# Patient Record
Sex: FEMALE | Race: BLACK OR AFRICAN AMERICAN | NOT HISPANIC OR LATINO | Employment: UNEMPLOYED | URBAN - METROPOLITAN AREA
[De-identification: names, ages, dates, MRNs, and addresses within clinical notes are randomized per-mention and may not be internally consistent; named-entity substitution may affect disease eponyms.]

---

## 2020-07-30 ENCOUNTER — TELEPHONE (OUTPATIENT)
Dept: ORTHOPEDICS | Facility: CLINIC | Age: 30
End: 2020-07-30

## 2020-07-30 NOTE — TELEPHONE ENCOUNTER
----- Message from Shreya Haile sent at 7/30/2020  1:46 PM CDT -----  Regarding: pt  Patient Requesting Sooner Appointment.     Reason for sooner appt.: pt is calling to speak with the nurse pt was seen in the Er over the weekend for right ankle pain pt was to follow up before August 2, 2020  When is the first available appointment? 8/2020  Communication Preference: can you please call pt at [phone]  Additional Information: none    VALERIE

## 2020-07-30 NOTE — TELEPHONE ENCOUNTER
Spoke with pt. ED follow up scheduled. Pt wanted to be seen as soon as possible. Pt verbalized understanding.

## 2020-07-31 ENCOUNTER — TELEPHONE (OUTPATIENT)
Dept: ORTHOPEDICS | Facility: CLINIC | Age: 30
End: 2020-07-31

## 2020-07-31 NOTE — TELEPHONE ENCOUNTER
Patient requested to have appointment rescheduled, patient verbalized understanding of of next appointment date, time and location

## 2020-08-03 ENCOUNTER — TELEPHONE (OUTPATIENT)
Dept: ORTHOPEDICS | Facility: CLINIC | Age: 30
End: 2020-08-03

## 2020-08-03 NOTE — TELEPHONE ENCOUNTER
Patient verbalized understanding of an earlier appointment time on 08/10 due too provider being unavailable that afternoon

## 2020-12-22 ENCOUNTER — OFFICE VISIT (OUTPATIENT)
Dept: SURGERY | Facility: CLINIC | Age: 30
End: 2020-12-22
Payer: MEDICAID

## 2020-12-22 VITALS
OXYGEN SATURATION: 94 % | HEIGHT: 60 IN | HEART RATE: 86 BPM | BODY MASS INDEX: 56.28 KG/M2 | WEIGHT: 286.69 LBS | DIASTOLIC BLOOD PRESSURE: 97 MMHG | RESPIRATION RATE: 16 BRPM | SYSTOLIC BLOOD PRESSURE: 138 MMHG

## 2020-12-22 DIAGNOSIS — K80.20 CALCULUS OF GALLBLADDER WITHOUT CHOLECYSTITIS WITHOUT OBSTRUCTION: Primary | ICD-10-CM

## 2020-12-22 DIAGNOSIS — Z01.818 PRE-OP TESTING: ICD-10-CM

## 2020-12-22 DIAGNOSIS — K80.20 SYMPTOMATIC CHOLELITHIASIS: ICD-10-CM

## 2020-12-22 PROCEDURE — 99999 PR PBB SHADOW E&M-EST. PATIENT-LVL V: ICD-10-PCS | Mod: PBBFAC,,, | Performed by: SURGERY

## 2020-12-22 PROCEDURE — 99203 PR OFFICE/OUTPT VISIT, NEW, LEVL III, 30-44 MIN: ICD-10-PCS | Mod: S$PBB,,, | Performed by: SURGERY

## 2020-12-22 PROCEDURE — 99215 OFFICE O/P EST HI 40 MIN: CPT | Mod: PBBFAC,PN | Performed by: SURGERY

## 2020-12-22 PROCEDURE — 99203 OFFICE O/P NEW LOW 30 MIN: CPT | Mod: S$PBB,,, | Performed by: SURGERY

## 2020-12-22 PROCEDURE — 99999 PR PBB SHADOW E&M-EST. PATIENT-LVL V: CPT | Mod: PBBFAC,,, | Performed by: SURGERY

## 2020-12-22 RX ORDER — ENOXAPARIN SODIUM 300 MG/3ML
40 INJECTION INTRAVENOUS; SUBCUTANEOUS
Status: CANCELLED | OUTPATIENT
Start: 2020-12-28

## 2020-12-22 NOTE — LETTER
December 22, 2020      Aaliyah Bustillos, NP  [address]  [address]  [place] [address]           [facility] [address]  [address], [facility] [address]  [facility] [address]  Phone: [phone]  Fax: [phone]          Patient: Arlene Mathur   MR Number: [id number]   YOB: 1990   Date of Visit: 12/22/2020       Dear Aaliyah Bustillos:    Thank you for referring Arlene Mathur to me for evaluation. Attached you will find relevant portions of my assessment and plan of care.    If you have questions, please do not hesitate to call me. I look forward to following Arlene Mathur along with you.    Sincerely,    Edmond Mayfield MD    Enclosure  CC:  No Recipients    If you would like to receive this communication electronically, please contact [email or web address] or [phone] to request more information on [employer] Link access.    For providers and/or their staff who would like to refer a patient to [facility], please contact us through our one-stop-shop provider referral line, [facility], at [phone].    If you feel you have received this communication in error or would no longer like to receive these types of communications, please e-mail [email or web address]

## 2020-12-22 NOTE — PROGRESS NOTES
"History & Physical    SUBJECTIVE:     History of Present Illness:  Patient is a 30 y.o. female presents with symptoms consistent with symptomatic cholelithiasis.  She complains of right upper quadrant abdominal pain initially with meals.  Also has some nausea associated with this.  Pain is usually the mid abdomen right upper quadrant.  Also states that she has reflux with certain foods.  She had a CT scan performed when she went to the emergency department which showed gallstones and some gallbladder wall thickening.  I discussed with her these findings went over the CT scan and showed her her gallbladder.  She agrees to proceed with laparoscopic cholecystectomy.      Chief Complaint   Patient presents with    Gall Bladder Problem       Review of patient's allergies indicates:   Allergen Reactions    Morphine Other (See Comments)     Pt states she does not like the way it makes her feel "        Current Outpatient Medications   Medication Sig Dispense Refill    HYDROcodone-acetaminophen (NORCO) 5-325 mg per tablet Take 1 tablet by mouth every 4 (four) hours as needed. (Patient not taking: Reported on 2020) 10 tablet 0     No current facility-administered medications for this visit.        History reviewed. No pertinent past medical history.  Past Surgical History:   Procedure Laterality Date    ANKLE SURGERY  2004    R ankle with hardware placed     SECTION, CLASSIC  2012    LEG SURGERY      plates and screws     History reviewed. No pertinent family history.  Social History     Tobacco Use    Smoking status: Former Smoker     Years: 2.00     Types: Cigarettes     Quit date: 6/3/2011     Years since quittin.5   Substance Use Topics    Alcohol use: Yes     Alcohol/week: 1.0 standard drinks     Types: 1 Shots of liquor per week    Drug use: No        Review of Systems:  Review of Systems   Constitutional: Negative for appetite change, fatigue, fever and unexpected weight change.   HENT: " Negative for sore throat and trouble swallowing.    Eyes: Negative.    Respiratory: Negative for cough, shortness of breath and wheezing.    Cardiovascular: Negative for chest pain and leg swelling.   Gastrointestinal: Positive for abdominal pain and nausea. Negative for abdominal distention, blood in stool, constipation, diarrhea and vomiting.   Endocrine: Negative.    Genitourinary: Negative.    Musculoskeletal: Negative for back pain.   Skin: Negative.  Negative for rash.   Allergic/Immunologic: Negative.    Neurological: Negative.    Hematological: Negative.    Psychiatric/Behavioral: Negative for confusion.       OBJECTIVE:     Vital Signs (Most Recent)  Pulse: 86 (12/22/20 1514)  Resp: 16 (12/22/20 1514)  BP: (!) 138/97 (12/22/20 1514)  SpO2: (!) 94 % (12/22/20 1514)  5' (1.524 m)  130 kg (286 lb 11.3 oz)     Physical Exam:  Physical Exam  Vitals signs and nursing note reviewed.   Constitutional:       Appearance: She is well-developed.   HENT:      Head: Normocephalic and atraumatic.   Neck:      Musculoskeletal: Normal range of motion.   Cardiovascular:      Rate and Rhythm: Normal rate.      Heart sounds: Normal heart sounds.   Pulmonary:      Effort: Pulmonary effort is normal.   Abdominal:      General: Bowel sounds are normal. There is no distension.      Palpations: Abdomen is soft.      Tenderness: There is abdominal tenderness (Mild in right upper quadrant).   Musculoskeletal: Normal range of motion.   Skin:     General: Skin is warm and dry.      Capillary Refill: Capillary refill takes less than 2 seconds.   Neurological:      Mental Status: She is alert and oriented to person, place, and time.   Psychiatric:         Behavior: Behavior normal.         Laboratory  CBC: Reviewed  CMP: Reviewed    Diagnostic Results:  CT: Reviewed  Cholelithiasis with gallbladder wall    ASSESSMENT/PLAN:     30-year-old female with symptomatic cholelithiasis  Risks and benefits of surgery discussed with  patient  Consent signed in clinic     I described the nature of the patient's pathology and the spectrum of disease presentation ranging from mild discomfort to acute cholecystitis or gallstone/necrotizing pancreatitis. Non-operative therapy was discussed, but the patient would require a strict non-fat diet and still this would not guarantee resolution of symptoms. I think surgery is in the patient's best interest given the severity of symptoms, and she is agreeable. I described the risks of the surgery including but not limited to bleeding, infection, wound complications, injury to local structures including the common bile duct, bile leak, persistent abd pain, and potential need for further interventions. The patient demonstrated understanding of these risks and asked appropriate questions. A consent form was signed today, and the patient will be booked for surgery as laparoscopic cholecystectomy, possible open, possible intraoperative cholangiogram.

## 2020-12-22 NOTE — PATIENT INSTRUCTIONS
SURGERY INSTRUCTIONS:    Surgery date 12/28/2020      · You will arrive on the 1st floor of [facility] at the registration desk the morning of surgery.  · You WILL need to be tested for COVID-19, 3 days prior to your surgery date.  · Do not eat or drink after midnight the night before surgery including water. It is okay to brush your teeth. Do not have gum, candy or mints  · Please shower the night before and the morning of your surgery with antibacterial soap, concentrating on the area where your surgery will be performed, be sure to get into skin folds as well.   · No shaving of procedural area at least 4-5 days before surgery due to increased risk of skin irritation and/or possible infection.  · Wear loose fitting/comfortable clothing allowing for bandages.  · If you are going home on the same day of surgery, you must arrange for a family member or a friend to drive you home.  Public transportation is prohibited.  You will not be able to drive home if you were given anesthesia or sedation.  · Stop taking Aspirin, Ibuprofen, Motrin, Aleve, Fish oil or Vitamin E for at least 7 days before your surgery. You may use Tylenol.  · Pre-OP will call you with any additional instructions, surgery arrival time, and to schedule your COVID-19 test if it is not already scheduled.  · If you have any additional questions, you may call pre-OP at [phone]

## 2020-12-28 PROBLEM — K80.20 SYMPTOMATIC CHOLELITHIASIS: Status: ACTIVE | Noted: 2020-12-28

## 2021-01-06 ENCOUNTER — TELEPHONE (OUTPATIENT)
Dept: SURGERY | Facility: CLINIC | Age: 31
End: 2021-01-06

## 2021-01-11 ENCOUNTER — OFFICE VISIT (OUTPATIENT)
Dept: SURGERY | Facility: CLINIC | Age: 31
End: 2021-01-11
Payer: MEDICAID

## 2021-01-11 VITALS
HEART RATE: 71 BPM | SYSTOLIC BLOOD PRESSURE: 134 MMHG | RESPIRATION RATE: 16 BRPM | HEIGHT: 60 IN | WEIGHT: 283.81 LBS | DIASTOLIC BLOOD PRESSURE: 94 MMHG | BODY MASS INDEX: 55.72 KG/M2 | OXYGEN SATURATION: 97 %

## 2021-01-11 DIAGNOSIS — Z98.890 POST-OPERATIVE STATE: Primary | ICD-10-CM

## 2021-01-11 PROCEDURE — 99024 PR POST-OP FOLLOW-UP VISIT: ICD-10-PCS | Mod: ,,, | Performed by: SURGERY

## 2021-01-11 PROCEDURE — 99024 POSTOP FOLLOW-UP VISIT: CPT | Mod: ,,, | Performed by: SURGERY

## 2021-01-11 PROCEDURE — 99213 OFFICE O/P EST LOW 20 MIN: CPT | Mod: PBBFAC,PN | Performed by: SURGERY

## 2021-01-11 PROCEDURE — 99999 PR PBB SHADOW E&M-EST. PATIENT-LVL III: CPT | Mod: PBBFAC,,, | Performed by: SURGERY

## 2021-01-11 PROCEDURE — 99999 PR PBB SHADOW E&M-EST. PATIENT-LVL III: ICD-10-PCS | Mod: PBBFAC,,, | Performed by: SURGERY

## 2021-04-26 ENCOUNTER — PATIENT MESSAGE (OUTPATIENT)
Dept: RESEARCH | Facility: HOSPITAL | Age: 31
End: 2021-04-26

## 2021-12-19 ENCOUNTER — HOSPITAL ENCOUNTER (EMERGENCY)
Facility: HOSPITAL | Age: 31
Discharge: HOME | End: 2021-12-19
Attending: EMERGENCY MEDICINE
Payer: MEDICAID

## 2021-12-19 VITALS
TEMPERATURE: 98 F | SYSTOLIC BLOOD PRESSURE: 132 MMHG | BODY MASS INDEX: 46.72 KG/M2 | HEART RATE: 72 BPM | DIASTOLIC BLOOD PRESSURE: 88 MMHG | OXYGEN SATURATION: 99 % | WEIGHT: 238 LBS | RESPIRATION RATE: 18 BRPM | HEIGHT: 60 IN

## 2021-12-19 DIAGNOSIS — M25.511 RIGHT SHOULDER PAIN: Primary | ICD-10-CM

## 2021-12-19 LAB
B-HCG UR QL: NEGATIVE
CTP QC/QA: YES

## 2021-12-19 PROCEDURE — 63600175 PHARM REV CODE 636 W HCPCS: Performed by: NURSE PRACTITIONER

## 2021-12-19 PROCEDURE — 81025 URINE PREGNANCY TEST: CPT | Performed by: NURSE PRACTITIONER

## 2021-12-19 PROCEDURE — 96372 THER/PROPH/DIAG INJ SC/IM: CPT

## 2021-12-19 PROCEDURE — 99284 EMERGENCY DEPT VISIT MOD MDM: CPT | Mod: 25

## 2021-12-19 RX ORDER — KETOROLAC TROMETHAMINE 10 MG/1
10 TABLET, FILM COATED ORAL EVERY 6 HOURS PRN
Qty: 20 TABLET | Refills: 0 | Status: SHIPPED | OUTPATIENT
Start: 2021-12-19 | End: 2021-12-24

## 2021-12-19 RX ORDER — LIDOCAINE 50 MG/G
1 PATCH TOPICAL DAILY
Qty: 15 PATCH | Refills: 0 | Status: SHIPPED | OUTPATIENT
Start: 2021-12-19

## 2021-12-19 RX ORDER — KETOROLAC TROMETHAMINE 30 MG/ML
30 INJECTION, SOLUTION INTRAMUSCULAR; INTRAVENOUS
Status: COMPLETED | OUTPATIENT
Start: 2021-12-19 | End: 2021-12-19

## 2021-12-19 RX ORDER — ORPHENADRINE CITRATE 100 MG/1
100 TABLET, EXTENDED RELEASE ORAL 2 TIMES DAILY PRN
Qty: 20 TABLET | Refills: 0 | Status: SHIPPED | OUTPATIENT
Start: 2021-12-19 | End: 2021-12-29

## 2021-12-19 RX ORDER — KETOROLAC TROMETHAMINE 30 MG/ML
INJECTION, SOLUTION INTRAMUSCULAR; INTRAVENOUS
Status: DISPENSED
Start: 2021-12-19 | End: 2021-12-19

## 2021-12-19 RX ADMIN — KETOROLAC TROMETHAMINE 30 MG: 30 INJECTION, SOLUTION INTRAMUSCULAR; INTRAVENOUS at 11:12

## 2021-12-29 ENCOUNTER — LAB VISIT (OUTPATIENT)
Dept: PRIMARY CARE CLINIC | Facility: OTHER | Age: 31
End: 2021-12-29
Attending: INTERNAL MEDICINE
Payer: MEDICAID

## 2021-12-29 DIAGNOSIS — Z20.822 ENCOUNTER FOR LABORATORY TESTING FOR COVID-19 VIRUS: ICD-10-CM

## 2021-12-29 PROCEDURE — U0003 INFECTIOUS AGENT DETECTION BY NUCLEIC ACID (DNA OR RNA); SEVERE ACUTE RESPIRATORY SYNDROME CORONAVIRUS 2 (SARS-COV-2) (CORONAVIRUS DISEASE [COVID-19]), AMPLIFIED PROBE TECHNIQUE, MAKING USE OF HIGH THROUGHPUT TECHNOLOGIES AS DESCRIBED BY CMS-2020-01-R: HCPCS | Performed by: INTERNAL MEDICINE

## 2021-12-30 ENCOUNTER — PATIENT MESSAGE (OUTPATIENT)
Dept: ADMINISTRATIVE | Facility: OTHER | Age: 31
End: 2021-12-30
Payer: MEDICAID

## 2021-12-31 LAB
SARS-COV-2 RNA RESP QL NAA+PROBE: DETECTED
SARS-COV-2- CYCLE NUMBER: 17

## 2022-02-27 ENCOUNTER — HOSPITAL ENCOUNTER (EMERGENCY)
Facility: HOSPITAL | Age: 32
Discharge: HOME | End: 2022-02-28
Attending: EMERGENCY MEDICINE
Payer: MEDICAID

## 2022-02-27 DIAGNOSIS — R10.9 ABDOMINAL PAIN, UNSPECIFIED ABDOMINAL LOCATION: Primary | ICD-10-CM

## 2022-02-27 LAB
B-HCG UR QL: NEGATIVE
BASOPHILS # BLD AUTO: 0.03 K/UL (ref 0–0.2)
BASOPHILS NFR BLD: 0.2 % (ref 0–1.9)
BILIRUB UR QL STRIP: NEGATIVE
CLARITY UR: CLEAR
COLOR UR: YELLOW
CTP QC/QA: YES
DIFFERENTIAL METHOD: ABNORMAL
EOSINOPHIL # BLD AUTO: 0 K/UL (ref 0–0.5)
EOSINOPHIL NFR BLD: 0.2 % (ref 0–8)
ERYTHROCYTE [DISTWIDTH] IN BLOOD BY AUTOMATED COUNT: 20.1 % (ref 11.5–14.5)
GLUCOSE UR QL STRIP: NEGATIVE
HCT VFR BLD AUTO: 39.3 % (ref 37–48.5)
HGB BLD-MCNC: 11.8 G/DL (ref 12–16)
HGB UR QL STRIP: NEGATIVE
IMM GRANULOCYTES # BLD AUTO: 0.04 K/UL (ref 0–0.04)
IMM GRANULOCYTES NFR BLD AUTO: 0.3 % (ref 0–0.5)
KETONES UR QL STRIP: NEGATIVE
LEUKOCYTE ESTERASE UR QL STRIP: NEGATIVE
LYMPHOCYTES # BLD AUTO: 1.1 K/UL (ref 1–4.8)
LYMPHOCYTES NFR BLD: 8 % (ref 18–48)
MCH RBC QN AUTO: 20.1 PG (ref 27–31)
MCHC RBC AUTO-ENTMCNC: 30 G/DL (ref 32–36)
MCV RBC AUTO: 67 FL (ref 82–98)
MONOCYTES # BLD AUTO: 0.6 K/UL (ref 0.3–1)
MONOCYTES NFR BLD: 4.4 % (ref 4–15)
NEUTROPHILS # BLD AUTO: 11.4 K/UL (ref 1.8–7.7)
NEUTROPHILS NFR BLD: 86.9 % (ref 38–73)
NITRITE UR QL STRIP: NEGATIVE
NRBC BLD-RTO: 0 /100 WBC
PH UR STRIP: 6 [PH] (ref 5–8)
PLATELET # BLD AUTO: 458 K/UL (ref 150–450)
PMV BLD AUTO: 10.7 FL (ref 9.2–12.9)
PROT UR QL STRIP: NEGATIVE
RBC # BLD AUTO: 5.86 M/UL (ref 4–5.4)
SP GR UR STRIP: 1.01 (ref 1–1.03)
URN SPEC COLLECT METH UR: NORMAL
UROBILINOGEN UR STRIP-ACNC: NEGATIVE EU/DL
WBC # BLD AUTO: 13.16 K/UL (ref 3.9–12.7)

## 2022-02-27 PROCEDURE — 83690 ASSAY OF LIPASE: CPT | Performed by: PHYSICIAN ASSISTANT

## 2022-02-27 PROCEDURE — 85025 COMPLETE CBC W/AUTO DIFF WBC: CPT | Performed by: PHYSICIAN ASSISTANT

## 2022-02-27 PROCEDURE — 81003 URINALYSIS AUTO W/O SCOPE: CPT | Performed by: EMERGENCY MEDICINE

## 2022-02-27 PROCEDURE — 80053 COMPREHEN METABOLIC PANEL: CPT | Performed by: PHYSICIAN ASSISTANT

## 2022-02-27 PROCEDURE — 63600175 PHARM REV CODE 636 W HCPCS: Performed by: PHYSICIAN ASSISTANT

## 2022-02-27 PROCEDURE — 83735 ASSAY OF MAGNESIUM: CPT | Performed by: PHYSICIAN ASSISTANT

## 2022-02-27 PROCEDURE — 99285 EMERGENCY DEPT VISIT HI MDM: CPT | Mod: 25

## 2022-02-27 PROCEDURE — 96372 THER/PROPH/DIAG INJ SC/IM: CPT

## 2022-02-27 PROCEDURE — 81025 URINE PREGNANCY TEST: CPT | Performed by: EMERGENCY MEDICINE

## 2022-02-27 RX ORDER — DICYCLOMINE HYDROCHLORIDE 10 MG/ML
20 INJECTION INTRAMUSCULAR
Status: COMPLETED | OUTPATIENT
Start: 2022-02-27 | End: 2022-02-27

## 2022-02-27 RX ADMIN — DICYCLOMINE HYDROCHLORIDE 20 MG: 20 INJECTION INTRAMUSCULAR at 11:02

## 2022-02-28 VITALS
SYSTOLIC BLOOD PRESSURE: 134 MMHG | HEART RATE: 98 BPM | TEMPERATURE: 98 F | RESPIRATION RATE: 16 BRPM | BODY MASS INDEX: 45.16 KG/M2 | DIASTOLIC BLOOD PRESSURE: 79 MMHG | HEIGHT: 60 IN | WEIGHT: 230 LBS | OXYGEN SATURATION: 99 %

## 2022-02-28 LAB
ALBUMIN SERPL BCP-MCNC: 4.1 G/DL (ref 3.5–5.2)
ALP SERPL-CCNC: 89 U/L (ref 55–135)
ALT SERPL W/O P-5'-P-CCNC: 33 U/L (ref 10–44)
ANION GAP SERPL CALC-SCNC: 11 MMOL/L (ref 8–16)
AST SERPL-CCNC: 27 U/L (ref 10–40)
BILIRUB SERPL-MCNC: 0.6 MG/DL (ref 0.1–1)
BUN SERPL-MCNC: 10 MG/DL (ref 6–20)
CALCIUM SERPL-MCNC: 9.6 MG/DL (ref 8.7–10.5)
CHLORIDE SERPL-SCNC: 105 MMOL/L (ref 95–110)
CO2 SERPL-SCNC: 25 MMOL/L (ref 23–29)
CREAT SERPL-MCNC: 0.8 MG/DL (ref 0.5–1.4)
EST. GFR  (AFRICAN AMERICAN): >60 ML/MIN/1.73 M^2
EST. GFR  (NON AFRICAN AMERICAN): >60 ML/MIN/1.73 M^2
GLUCOSE SERPL-MCNC: 88 MG/DL (ref 70–110)
LIPASE SERPL-CCNC: 19 U/L (ref 4–60)
MAGNESIUM SERPL-MCNC: 2 MG/DL (ref 1.6–2.6)
POTASSIUM SERPL-SCNC: 4 MMOL/L (ref 3.5–5.1)
PROT SERPL-MCNC: 8.7 G/DL (ref 6–8.4)
SODIUM SERPL-SCNC: 141 MMOL/L (ref 136–145)

## 2022-02-28 PROCEDURE — 25500020 PHARM REV CODE 255: Performed by: EMERGENCY MEDICINE

## 2022-02-28 RX ORDER — DICYCLOMINE HYDROCHLORIDE 20 MG/1
20 TABLET ORAL 4 TIMES DAILY PRN
Qty: 20 TABLET | Refills: 0 | Status: SHIPPED | OUTPATIENT
Start: 2022-02-28 | End: 2022-03-30

## 2022-02-28 RX ORDER — FAMOTIDINE 20 MG/1
20 TABLET, FILM COATED ORAL 2 TIMES DAILY
Qty: 28 TABLET | Refills: 0 | Status: SHIPPED | OUTPATIENT
Start: 2022-02-28 | End: 2022-03-14

## 2022-02-28 RX ADMIN — IOHEXOL 100 ML: 350 INJECTION, SOLUTION INTRAVENOUS at 12:02

## 2022-02-28 NOTE — DISCHARGE INSTRUCTIONS
Pepcid twice daily.  GERD diet.  Bentyl as needed for any abdominal cramping.    Please follow-up establish care with a local primary care for re-evaluation further recommendations.  Return to this ED if you begin worsening abdominal pain, nausea vomiting, fever, if any other problems occur.

## 2022-02-28 NOTE — ED NOTES
Pt presents to ED secondary to lower and left abdominal pain described as severe and sharp. States pain began shortly after eating a funnel cake this evening. Pain worse with movement. Pt denies cp, sob, URI symptoms, fever, chills, n/v/d, urinary symptoms, vaginal bleeding/discharge, and rash of any kind. NAD noted.

## 2022-02-28 NOTE — ED PROVIDER NOTES
"Encounter Date: 2022       History     Chief Complaint   Patient presents with    Abdominal Pain     Presents with lower centralized abdominal pain described as sharp and 10/10 x 3 hours, denies nausea, vomiting or diarrhea. Denies urinary symptoms, denies vaginal bleeding or discharge.     31-year-old female with acute onset periumbilical abdominal pain times approximately 3 hours.    Pain is intermittent, began after eating a funnel cake.  Pain is sharp, severe.  There is no radiation of the pain.  Denies any associated nausea or vomiting.  No trauma.  Denies history of similar symptoms.  No urinary complaints.  Denies flank pain.  No vaginal complaints.  Denies any recent illness.  Pain is worse with movement, somewhat alleviated with immobility.  States normal BMs recently.    PMH:  Status post cholecystectomy  Endometriosis  Severe obesity  Irregular menses        Review of patient's allergies indicates:   Allergen Reactions    Morphine Other (See Comments)     Pt states she does not like the way it makes her feel "      Past Medical History:   Diagnosis Date    Anemia      Past Surgical History:   Procedure Laterality Date    ANKLE SURGERY      R ankle with hardware placed     SECTION, CLASSIC      LAPAROSCOPIC CHOLECYSTECTOMY  2020    LAPAROSCOPIC CHOLECYSTECTOMY N/A 2020    Procedure: CHOLECYSTECTOMY, LAPAROSCOPIC;  Surgeon: Edmond Mayfield MD;  Location: [facility] OR;  Service: General;  Laterality: N/A;    LEG SURGERY      plates and screws    UMBILICAL HERNIA REPAIR N/A 2020    Procedure: REPAIR, HERNIA, UMBILICAL, AGE 5 YEARS OR OLDER;  Surgeon: Edmond Mayfield MD;  Location: [facility] OR;  Service: General;  Laterality: N/A;     No family history on file.  Social History     Tobacco Use    Smoking status: Former Smoker     Years: 2.00     Types: Cigarettes     Quit date: 6/3/2011     Years since quitting: 10.7   Substance Use Topics    Alcohol use: " Yes     Alcohol/week: 1.0 standard drink     Types: 1 Shots of liquor per week     Comment: occasionally    Drug use: No     Review of Systems   Constitutional: Negative for appetite change and fever.   Gastrointestinal: Positive for abdominal pain. Negative for constipation, diarrhea, nausea and vomiting.   Genitourinary: Negative for dysuria, flank pain, frequency and hematuria.   Musculoskeletal: Negative for back pain, myalgias, neck pain and neck stiffness.       Physical Exam     Initial Vitals [02/27/22 2221]   BP Pulse Resp Temp SpO2   (!) 161/73 91 18 99.2 °F (37.3 °C) 100 %      MAP       --         Physical Exam    Nursing note and vitals reviewed.  Constitutional: She appears well-developed and well-nourished. She is not diaphoretic. No distress.   Uncomfortable appearing, nontoxic.  Sitting upright on exam table.   Neck: Neck supple.   Normal range of motion.  Cardiovascular: Intact distal pulses.   1+DP bilaterally   Pulmonary/Chest: No respiratory distress.   Abdominal:   Abdomen overall soft, normal bowel sounds ×4.  TTP periumbilical region, suprapubic region, left lower quadrant.  Guarding to the periumbilical region. No flank or CVA tenderness. No pain over McBurney's point.   Musculoskeletal:         General: Normal range of motion.      Cervical back: Normal range of motion and neck supple.     Neurological: She is alert and oriented to person, place, and time. GCS score is 15. GCS eye subscore is 4. GCS verbal subscore is 5. GCS motor subscore is 6.   Skin: Skin is warm. Capillary refill takes less than 2 seconds.   Psychiatric: She has a normal mood and affect. Thought content normal.         ED Course   Procedures  Labs Reviewed   CBC W/ AUTO DIFFERENTIAL - Abnormal; Notable for the following components:       Result Value    WBC 13.16 (*)     RBC 5.86 (*)     Hemoglobin 11.8 (*)     MCV 67 (*)     MCH 20.1 (*)     MCHC 30.0 (*)     RDW 20.1 (*)     Platelets 458 (*)     Gran # (ANC) 11.4  (*)     Gran % 86.9 (*)     Lymph % 8.0 (*)     All other components within normal limits   COMPREHENSIVE METABOLIC PANEL - Abnormal; Notable for the following components:    Total Protein 8.7 (*)     All other components within normal limits   URINALYSIS, REFLEX TO URINE CULTURE    Narrative:     Specimen Source->Urine   LIPASE   MAGNESIUM   POCT URINE PREGNANCY          Imaging Results          CT Abdomen Pelvis With Contrast (Final result)  Result time 02/28/22 01:21:15    Final result by Rich Ybarra MD (02/28/22 01:21:15)                 Impression:      1. No acute intra-abdominal abnormalities identified.  2. Hepatosplenomegaly.  3. Cholecystectomy.      Electronically signed by: Rich Ybarra MD  Date:    02/28/2022  Time:    01:21             Narrative:    EXAMINATION:  CT ABDOMEN PELVIS WITH CONTRAST    CLINICAL HISTORY:  Bowel obstruction suspected;    TECHNIQUE:  Low dose axial images, sagittal and coronal reformations were obtained from the lung bases to the pubic symphysis following the IV administration of 100 mL of Omnipaque 350 .  Oral contrast was not given.    COMPARISON:  CT abdomen and pelvis from December 2020.    FINDINGS:  The visualized portion of the heart is unremarkable.  The lung bases are clear.    No significant focal hepatic abnormalities are identified.  Liver is enlarged measuring 21 cm.  Spleen is mildly enlarged measuring 13 cm.  There is no intra-or extrahepatic biliary ductal dilatation.  Gallbladder has been removed.  The stomach, pancreas, and adrenal glands are unremarkable.    Kidneys enhance normally with no evidence of hydronephrosis.  No abnormalities are seen along the ureteral courses.  Urinary bladder and uterus are unremarkable.  No significant adnexal abnormalities are seen.    Appendix is visualized and is unremarkable.  The visualized loops of small and large bowel show no evidence of obstruction or inflammation.  No free air or free fluid.    Aorta tapers  normally.    No acute osseous abnormality identified. Small fat containing umbilical hernia is seen.                                 Medications   dicyclomine injection 20 mg (20 mg Intramuscular Given 2/27/22 2312)   iohexoL (OMNIPAQUE 350) injection 100 mL (100 mLs Intravenous Given 2/28/22 0041)     Medical Decision Making:   Differential Diagnosis:   Small-bowel obstruction, enteritis, colitis, GERD, gastritis, appendicitis  Clinical Tests:   Lab Tests: Ordered  Radiological Study: Ordered             ED Course as of 02/28/22 0132   Mon Feb 28, 2022   0129 Pain is significantly improved on re-evaluation.  Very mild leukocytosis, otherwise labs unremarkable.  CT without evidence of obstruction or obvious emergent process.  Will DC with Pepcid b.i.d. given symptoms followed meal.  She does admit to history of GERD.  Return precautions discussed.  She understands and feels comfortable with discharge and outpatient follow-up. [SM]      ED Course User Index  [SM] Richie Urena PA-C             Clinical Impression:   Final diagnoses:  [R10.9] Abdominal pain, unspecified abdominal location (Primary)          ED Disposition Condition    Discharge Stable        ED Prescriptions     Medication Sig Dispense Start Date End Date Auth. Provider    famotidine (PEPCID) 20 MG tablet Take 1 tablet (20 mg total) by mouth 2 (two) times daily. for 14 days 28 tablet 2/28/2022 3/14/2022 Richie Urena PA-C    dicyclomine (BENTYL) 20 mg tablet Take 1 tablet (20 mg total) by mouth 4 (four) times daily as needed (Abdominal discomfort). 20 tablet 2/28/2022 3/30/2022 Richie Urena PA-C        Follow-up Information     Follow up With Specialties Details Why Contact Info    [facility]  Schedule an appointment as soon as possible for a visit  To establish primary care physician, for reevaluation [address] [facility] [address] [id number] [phone]      [facility]  Schedule an appointment as soon as  possible for a visit  To establish primary care physician, For reevaluation [address] [place] [address]  [phone]             Richie Urena PA-C  02/28/22 0133

## 2022-08-29 ENCOUNTER — HOSPITAL ENCOUNTER (EMERGENCY)
Facility: HOSPITAL | Age: 32
Discharge: HOME | End: 2022-08-29
Attending: EMERGENCY MEDICINE
Payer: MEDICAID

## 2022-08-29 VITALS
DIASTOLIC BLOOD PRESSURE: 94 MMHG | TEMPERATURE: 98 F | RESPIRATION RATE: 20 BRPM | WEIGHT: 230 LBS | HEIGHT: 60 IN | HEART RATE: 91 BPM | BODY MASS INDEX: 45.16 KG/M2 | OXYGEN SATURATION: 99 % | SYSTOLIC BLOOD PRESSURE: 162 MMHG

## 2022-08-29 DIAGNOSIS — J10.1 INFLUENZA A: Primary | ICD-10-CM

## 2022-08-29 LAB
CTP QC/QA: YES
CTP QC/QA: YES
POC MOLECULAR INFLUENZA A AGN: POSITIVE
POC MOLECULAR INFLUENZA B AGN: NEGATIVE
SARS-COV-2 RDRP RESP QL NAA+PROBE: NEGATIVE

## 2022-08-29 PROCEDURE — U0002 COVID-19 LAB TEST NON-CDC: HCPCS | Performed by: EMERGENCY MEDICINE

## 2022-08-29 PROCEDURE — 87502 INFLUENZA DNA AMP PROBE: CPT

## 2022-08-29 PROCEDURE — 25000003 PHARM REV CODE 250: Performed by: PHYSICIAN ASSISTANT

## 2022-08-29 PROCEDURE — 99284 EMERGENCY DEPT VISIT MOD MDM: CPT | Mod: 25

## 2022-08-29 RX ORDER — PEDI MULTIVIT NO.27/FOLIC ACID 100 MCG
2 TABLET,CHEWABLE ORAL EVERY 4 HOURS PRN
Qty: 24 EACH | Refills: 0 | Status: SHIPPED | OUTPATIENT
Start: 2022-08-29 | End: 2022-09-05

## 2022-08-29 RX ORDER — OSELTAMIVIR PHOSPHATE 75 MG/1
75 CAPSULE ORAL 2 TIMES DAILY
Qty: 10 CAPSULE | Refills: 0 | Status: SHIPPED | OUTPATIENT
Start: 2022-08-29 | End: 2022-09-03

## 2022-08-29 RX ORDER — PROMETHAZINE HYDROCHLORIDE AND DEXTROMETHORPHAN HYDROBROMIDE 6.25; 15 MG/5ML; MG/5ML
5 SYRUP ORAL EVERY 4 HOURS PRN
Qty: 118 ML | Refills: 0 | Status: SHIPPED | OUTPATIENT
Start: 2022-08-29 | End: 2022-09-08

## 2022-08-29 RX ORDER — ACETAMINOPHEN 325 MG/1
650 TABLET ORAL
Status: COMPLETED | OUTPATIENT
Start: 2022-08-29 | End: 2022-08-29

## 2022-08-29 RX ADMIN — ACETAMINOPHEN 650 MG: 325 TABLET ORAL at 08:08

## 2022-08-29 NOTE — Clinical Note
"Jacoboa "Brooks Mathur was seen and treated in our emergency department on 8/29/2022.  She may return to work on 09/01/2022.       If you have any questions or concerns, please don't hesitate to call.      Junaid Craven PA-C"

## 2022-08-30 NOTE — DISCHARGE INSTRUCTIONS

## 2022-08-30 NOTE — ED PROVIDER NOTES
"Encounter Date: 2022    SCRIBE #1 NOTE: I, Shreya Alana, am scribing for, and in the presence of,  Junaid Craven PA-C.     History     Chief Complaint   Patient presents with    COVID-19 Concerns     Pt to ED with complaints of cough, fevers, congestion X3 days. Pt denies exposure to Covid. Pt reports "I feel terrible." Pt states she has taken "pretty much everything" OTC, with no relief.      CC: viral illness concerns    HPI: 31 y.o. female with PMHx of anemia, presents to the ED with viral illness concerns. Patient reports a 2 day hx of chills, cough, myalgias, congestion, chest pain only with coughing. She initially had a fever that has currently resolved. She has taken OTC medications without relief. She reports her child has a sick contact with influenza at school. No other exacerbating or alleviating factors. No hx of asthma or bronchitis. Denies vomiting, or other associated symptoms.      The history is provided by the patient.   Review of patient's allergies indicates:   Allergen Reactions    Morphine Other (See Comments)     Pt states she does not like the way it makes her feel "      Past Medical History:   Diagnosis Date    Anemia      Past Surgical History:   Procedure Laterality Date    ANKLE SURGERY      R ankle with hardware placed     SECTION, CLASSIC  2012    LAPAROSCOPIC CHOLECYSTECTOMY  2020    LAPAROSCOPIC CHOLECYSTECTOMY N/A 2020    Procedure: CHOLECYSTECTOMY, LAPAROSCOPIC;  Surgeon: Edmond Mayfield MD;  Location: [facility] OR;  Service: General;  Laterality: N/A;    LEG SURGERY      plates and screws    UMBILICAL HERNIA REPAIR N/A 2020    Procedure: REPAIR, HERNIA, UMBILICAL, AGE 5 YEARS OR OLDER;  Surgeon: Edmond Mayfield MD;  Location: [facility] OR;  Service: General;  Laterality: N/A;     No family history on file.  Social History     Tobacco Use    Smoking status: Former     Years: 2.00     Types: Cigarettes     Quit date: 6/3/2011     Years since " quittin.2   Substance Use Topics    Alcohol use: Yes     Alcohol/week: 1.0 standard drink     Types: 1 Shots of liquor per week     Comment: occasionally    Drug use: No     Review of Systems   Constitutional:  Positive for chills and fever (resolved).   HENT:  Positive for congestion. Negative for rhinorrhea and sore throat.    Eyes:  Negative for visual disturbance.   Respiratory:  Positive for cough. Negative for shortness of breath.    Cardiovascular:  Positive for chest pain (only w coughing).   Gastrointestinal:  Negative for abdominal pain, diarrhea, nausea and vomiting.   Genitourinary:  Negative for dysuria, frequency and hematuria.   Musculoskeletal:  Positive for myalgias. Negative for back pain.   Skin:  Negative for rash.   Neurological:  Negative for dizziness, weakness and headaches.     Physical Exam     Initial Vitals [22 1830]   BP Pulse Resp Temp SpO2   131/69 101 15 98.8 °F (37.1 °C) 98 %      MAP       --         Physical Exam    Nursing note and vitals reviewed.  Constitutional: She appears well-developed and well-nourished. She is not diaphoretic. No distress.   HENT:   Head: Atraumatic.   Right Ear: External ear normal.   Left Ear: External ear normal.   Nasal congestion present   Eyes: Conjunctivae and EOM are normal.   Neck: No tracheal deviation present.   Normal range of motion.  Cardiovascular:  Normal rate and regular rhythm.           Pulmonary/Chest: No accessory muscle usage or stridor. No tachypnea. No respiratory distress.   Musculoskeletal:         General: No edema. Normal range of motion.      Cervical back: Normal range of motion.     Neurological: She is alert and oriented to person, place, and time. She displays no tremor. She displays no seizure activity. Coordination and gait normal.   Skin: Skin is intact. Capillary refill takes less than 2 seconds. No rash noted. No erythema.       ED Course   Procedures  Labs Reviewed   POCT INFLUENZA A/B MOLECULAR - Abnormal;  Notable for the following components:       Result Value    POC Molecular Influenza A Ag Positive (*)     All other components within normal limits   SARS-COV-2 RDRP GENE   POCT URINE PREGNANCY          Imaging Results    None          Medications   acetaminophen tablet 650 mg (650 mg Oral Given 8/29/22 2012)     Medical Decision Making:   History:   Old Medical Records: I decided to obtain old medical records.  Clinical Tests:   Lab Tests: Ordered and Reviewed  ED Management:  Flu A positive.  COVID-19 negative.  No hypoxia or respiratory distress.  Low suspicion for bacterial pneumonia.  Remainder of exam normal.  Advising follow-up with PCP. Strict return precautions discussed.  Agreeable to plan.        Scribe Attestation:   Scribe #1: I performed the above scribed service and the documentation accurately describes the services I performed. I attest to the accuracy of the note.               Clinical Impression:   Final diagnoses:  [J10.1] Influenza A (Primary)     I, Junaid Craven PA-C, personally performed the services described in this documentation. All medical record entries made by the scribe were at my direction and in my presence. I have reviewed the chart and agree that the record reflects my personal performance and is accurate and complete.     ED Disposition Condition    Discharge Stable          ED Prescriptions       Medication Sig Dispense Start Date End Date Auth. Provider    oseltamivir (TAMIFLU) 75 MG capsule Take 1 capsule (75 mg total) by mouth 2 (two) times daily. for 5 days 10 capsule 8/29/2022 9/3/2022 Junaid Craven PA-C    promethazine-dextromethorphan (PROMETHAZINE-DM) 6.25-15 mg/5 mL Syrp Take 5 mLs by mouth every 4 (four) hours as needed (cough). 118 mL 8/29/2022 9/8/2022 Junaid Craven PA-C    PE-DM-ASA/bbuvgo-YX-JQ-ASA (CORTNEY-SELTZER PLUS DAY-NIGHT) 7.8-325 mg(d)/ 6. mg(nt) TEfS Take 2 capsules by mouth every 4 (four) hours as needed. Do not exceed more than 10 capsules  in 24 hours. Do not exceed recommended dose. Children under 12 years of age:  DO NOT USE. 24 each 8/29/2022 9/5/2022 Junaid Craven PA-C          Follow-up Information       Follow up With Specialties Details Why Contact Info    [facility]  Schedule an appointment as soon as possible for a visit in 1 day For reevaluation [address] [place] [address]  [phone]      [facility] Emergency Medicine Go to  If symptoms worsen [address] [facility] [id number] [phone]             Junaid Craven PA-C  08/29/22 9409

## 2022-12-19 ENCOUNTER — TELEPHONE (OUTPATIENT)
Dept: ORTHOPEDICS | Facility: CLINIC | Age: 32
End: 2022-12-19
Payer: MEDICAID

## 2022-12-19 DIAGNOSIS — M25.562 LEFT KNEE PAIN, UNSPECIFIED CHRONICITY: Primary | ICD-10-CM

## 2022-12-21 ENCOUNTER — OFFICE VISIT (OUTPATIENT)
Dept: ORTHOPEDICS | Facility: CLINIC | Age: 32
End: 2022-12-21
Payer: MEDICAID

## 2022-12-21 VITALS
DIASTOLIC BLOOD PRESSURE: 87 MMHG | BODY MASS INDEX: 57.52 KG/M2 | HEIGHT: 60 IN | WEIGHT: 293 LBS | SYSTOLIC BLOOD PRESSURE: 136 MMHG | HEART RATE: 85 BPM

## 2022-12-21 DIAGNOSIS — E66.01 CLASS 3 SEVERE OBESITY WITH BODY MASS INDEX (BMI) OF 50.0 TO 59.9 IN ADULT, UNSPECIFIED OBESITY TYPE, UNSPECIFIED WHETHER SERIOUS COMORBIDITY PRESENT: ICD-10-CM

## 2022-12-21 DIAGNOSIS — M25.562 LEFT KNEE PAIN, UNSPECIFIED CHRONICITY: Primary | ICD-10-CM

## 2022-12-21 PROCEDURE — 3008F PR BODY MASS INDEX (BMI) DOCUMENTED: ICD-10-PCS | Mod: CPTII,,,

## 2022-12-21 PROCEDURE — 3079F DIAST BP 80-89 MM HG: CPT | Mod: CPTII,,,

## 2022-12-21 PROCEDURE — 99999 PR PBB SHADOW E&M-EST. PATIENT-LVL IV: CPT | Mod: PBBFAC,,,

## 2022-12-21 PROCEDURE — 3075F PR MOST RECENT SYSTOLIC BLOOD PRESS GE 130-139MM HG: ICD-10-PCS | Mod: CPTII,,,

## 2022-12-21 PROCEDURE — 3075F SYST BP GE 130 - 139MM HG: CPT | Mod: CPTII,,,

## 2022-12-21 PROCEDURE — 99204 OFFICE O/P NEW MOD 45 MIN: CPT | Mod: S$PBB,,,

## 2022-12-21 PROCEDURE — 3008F BODY MASS INDEX DOCD: CPT | Mod: CPTII,,,

## 2022-12-21 PROCEDURE — 99999 PR PBB SHADOW E&M-EST. PATIENT-LVL IV: ICD-10-PCS | Mod: PBBFAC,,,

## 2022-12-21 PROCEDURE — 3079F PR MOST RECENT DIASTOLIC BLOOD PRESSURE 80-89 MM HG: ICD-10-PCS | Mod: CPTII,,,

## 2022-12-21 PROCEDURE — 1159F PR MEDICATION LIST DOCUMENTED IN MEDICAL RECORD: ICD-10-PCS | Mod: CPTII,,,

## 2022-12-21 PROCEDURE — 99204 PR OFFICE/OUTPT VISIT, NEW, LEVL IV, 45-59 MIN: ICD-10-PCS | Mod: S$PBB,,,

## 2022-12-21 PROCEDURE — 99214 OFFICE O/P EST MOD 30 MIN: CPT | Mod: PBBFAC,PN

## 2022-12-21 PROCEDURE — 1159F MED LIST DOCD IN RCRD: CPT | Mod: CPTII,,,

## 2022-12-21 RX ORDER — CYCLOBENZAPRINE HCL 10 MG
10 TABLET ORAL
COMMUNITY
Start: 2022-12-06 | End: 2022-12-21 | Stop reason: SDUPTHER

## 2022-12-21 RX ORDER — CYCLOBENZAPRINE HCL 10 MG
10 TABLET ORAL 3 TIMES DAILY PRN
Qty: 90 TABLET | Refills: 2 | Status: SHIPPED | OUTPATIENT
Start: 2022-12-21

## 2022-12-21 RX ORDER — NAPROXEN 500 MG/1
500 TABLET ORAL
COMMUNITY
Start: 2022-12-05 | End: 2023-12-05

## 2022-12-21 RX ORDER — ALBUTEROL SULFATE 90 UG/1
1-2 AEROSOL, METERED RESPIRATORY (INHALATION) EVERY 6 HOURS PRN
COMMUNITY
Start: 2022-10-20 | End: 2023-10-20

## 2022-12-21 NOTE — PROGRESS NOTES
Patient ID: Arlene Mathur is a 32 y.o. female    Pain of the Left Knee      History of Present Illness:    Arlene Mathur presents to clinic for left knee pain. Patient denies known OCTAVIANO. The pain started 3 weeks ago and is becoming progressively worse.  Pain is located over (points to) posterior left knee. She reports that the pain is a 10 /10 sore pain toda. The pain is affecting ADLs and limiting desired level of activity. Denies numbness, tingling, radiation and inability to bear weight.  Patient states pain is worsened with prolonged standing as well as prolonged sitting then transitioning to standing.    Trial of anti-inflammatories and Flexeril with some improvement.     Occupation: teacher    Ambulating: unassisted  Diabetic: no  Smoking: no  Hx of DVT/PE: no     PAST MEDICAL HISTORY:   Past Medical History:   Diagnosis Date    Anemia      PAST SURGICAL HISTORY:   Past Surgical History:   Procedure Laterality Date    ANKLE SURGERY      R ankle with hardware placed     SECTION, CLASSIC      LAPAROSCOPIC CHOLECYSTECTOMY  2020    LAPAROSCOPIC CHOLECYSTECTOMY N/A 2020    Procedure: CHOLECYSTECTOMY, LAPAROSCOPIC;  Surgeon: Edmond Mayfield MD;  Location: [facility] OR;  Service: General;  Laterality: N/A;    LEG SURGERY      plates and screws    UMBILICAL HERNIA REPAIR N/A 2020    Procedure: REPAIR, HERNIA, UMBILICAL, AGE 5 YEARS OR OLDER;  Surgeon: Edmond Mayfield MD;  Location: [facility] OR;  Service: General;  Laterality: N/A;     FAMILY HISTORY: No family history on file.  SOCIAL HISTORY:   Social History     Occupational History    Not on file   Tobacco Use    Smoking status: Former     Years: 2.00     Types: Cigarettes     Quit date: 6/3/2011     Years since quittin.5    Smokeless tobacco: Not on file   Substance and Sexual Activity    Alcohol use: Yes     Alcohol/week: 1.0 standard drink     Types: 1 Shots of liquor per week     Comment: occasionally     "Drug use: No    Sexual activity: Not on file        MEDICATIONS:   Current Outpatient Medications:     albuterol (PROVENTIL/VENTOLIN HFA) 90 mcg/actuation inhaler, Inhale 1-2 puffs into the lungs every 6 (six) hours as needed., Disp: , Rfl:     naproxen (NAPROSYN) 500 MG tablet, Take 500 mg by mouth., Disp: , Rfl:     cyclobenzaprine (FLEXERIL) 10 MG tablet, Take 1 tablet (10 mg total) by mouth 3 (three) times daily as needed for Muscle spasms., Disp: 90 tablet, Rfl: 2    famotidine (PEPCID) 20 MG tablet, Take 1 tablet (20 mg total) by mouth 2 (two) times daily. for 14 days, Disp: 28 tablet, Rfl: 0    HYDROcodone-acetaminophen (NORCO) 5-325 mg per tablet, Take 1 tablet by mouth every 4 (four) hours as needed. (Patient not taking: Reported on 12/21/2022), Disp: 10 tablet, Rfl: 0    LIDOcaine (LIDODERM) 5 %, Place 1 patch onto the skin once daily. Remove & Discard patch within 12 hours or as directed by MD (Patient not taking: Reported on 12/21/2022), Disp: 15 patch, Rfl: 0    ondansetron (ZOFRAN) 8 MG tablet, Take 1 tablet (8 mg total) by mouth every 8 (eight) hours as needed for Nausea. (Patient not taking: Reported on 12/21/2022), Disp: 20 tablet, Rfl: 0    oxyCODONE-acetaminophen (PERCOCET) 7.5-325 mg per tablet, Take 1 tablet by mouth every 6 (six) hours as needed for Pain. (Patient not taking: Reported on 12/21/2022), Disp: 20 tablet, Rfl: 0  ALLERGIES:   Review of patient's allergies indicates:   Allergen Reactions    Morphine Other (See Comments)     Pt states she does not like the way it makes her feel "          Physical Exam     Vitals:    12/21/22 1306   BP: 136/87   Pulse: 85     Alert and oriented to person, place and time. No acute distress. Well-groomed, not ill appearing. Pupils round and reactive, normal respiratory effort, no audible wheezing.     OBSERVATION / INSPECTION   Gait:   Antalgic  Alignment:  Neutral   Scars:   None   Muscle atrophy: None  Effusion:  None   Warmth:  None   Discoloration: "   None     TENDERNESS                 Patella     Negative    Peripatellar medial    Negative   Peripatellar lateral   Negative   Patellar tendon   Negative   Quad tendon     Negative    Prepatellar Bursa   Negative     Tibial tubercle    Negative    Pes anserine/HS   Negative      ITB     Negative      LCL     Negative  MFC     Negative  LFC     Negative  MCL      Negative  Medial Joint Line    positive   Lateral Joint Line   Negative  Quadriceps    Negative  Hamstring     Negative            Range of  Motion:        Left knee:   0-130°  Right knee:    0-100° *      Patellofemoral examination:     Patella position    Centered  Crepitus (PF):    Absent   Lateral tilt:    Normal   J-sign:     None   Patellofemoral grind:   No pain       MENISCAL SIGNS:     Pain on terminal extension:  Negative  Pain on terminal flexion:  Negative  Barrons maneuver:  Negative     LIGAMENT EXAMINATION:  ACL / Lachman:  normal   PCL-Post.  drawer: normal 0 to 2mm  MCL- Valgus:  normal 0 to 2mm  LCL- Varus:    normal 0 to 2mm    Dial Test: difference c/w other side  At 30° flexion: normal (< 5°)   At 90° flexion: normal (< 5°)       STRENGTH: (* = with pain)   Quadricep   5/5  Hamstrin/5    EXTREMITY NEURO-VASCULAR EXAMINATION:   Sensation:  Grossly intact to light touch all dermatomal regions.   Motor Function:  Fully intact motor function at hip, knee, foot and ankle    DTRs;  quadriceps and  achilles 2+.  No clonus and downgoing Babinski.    Vascular status:  DP and PT pulses 2+, brisk capillary refill, symmetric.     Imaging:     Bilateral knee X-rays ordered/reviewed by me showing no evidence of fracture or dislocation. There is no obvious malalignment. No evidence of masses, lesions or foreign bodies. Mild degenerative change about the right knee. No advanced osteoarthritic change on the left, mild joint space narrowing in the medial tibiofemoral compartment.    Assessment & Plan    Left knee pain, unspecified  chronicity  -     Ambulatory referral/consult to Physical/Occupational Therapy; Future; Expected date: 12/28/2022  -     cyclobenzaprine (FLEXERIL) 10 MG tablet; Take 1 tablet (10 mg total) by mouth 3 (three) times daily as needed for Muscle spasms.  Dispense: 90 tablet; Refill: 2    Class 3 severe obesity with body mass index (BMI) of 50.0 to 59.9 in adult, unspecified obesity type, unspecified whether serious comorbidity present  -     Ambulatory referral/consult to Bariatric Medicine; Future; Expected date: 12/28/2022  -     Ambulatory referral/consult to Bariatric Medicine; Future; Expected date: 12/28/2022         I made the decision to obtain old records of the patient including previous notes and imaging. New imaging was ordered today of the extremity or extremities evaluated. I independently reviewed and interpreted the radiographs and/or MRIs/CT scan today as well as prior imaging.    We discussed at length different treatment options including conservative vs surgical management. These include anti-inflammatories, acetaminophen, rest, ice, heat, formal physical therapy including strengthening and stretching exercises, home exercise programs, injections, dry needling, and finally surgical intervention.      Patient would like to proceed with a trial of weight loss, physical therapy, and anti-inflammatories    Patient understands that increased BMI can contribute to chronic knee pain. Currently Body mass index is 57.35 kg/m². She was given a BMI short term goal of 50 today.  Ambulatory referral to Bariatrics/Weight Management Program. External referral placed  Continue naproxen as needed.  Continue Flexeril as needed, refill sent.  Instructed patient this may make her drowsy do not drive on medication.  Ice compress to the affected area 2-3x a day for 15-20 minutes as needed for pain management  Ambulatory referral to physical therapy for patellofemoral strengthening and conditioning.  Goal increase range of  motion.  Consider CSI if pain is refractory to conservative treatment    Follow up: 3 months  X-rays next visit: none    All questions were answered and patient is agreeable to the above plan.